# Patient Record
Sex: MALE | Race: WHITE | Employment: FULL TIME | URBAN - METROPOLITAN AREA
[De-identification: names, ages, dates, MRNs, and addresses within clinical notes are randomized per-mention and may not be internally consistent; named-entity substitution may affect disease eponyms.]

---

## 2018-03-21 ENCOUNTER — HOSPITAL ENCOUNTER (OUTPATIENT)
Dept: CV DIAGNOSTICS | Facility: HOSPITAL | Age: 73
Discharge: HOME | End: 2018-03-21
Attending: FAMILY MEDICINE
Payer: COMMERCIAL

## 2018-03-21 DIAGNOSIS — R07.89 CHEST PRESSURE: ICD-10-CM

## 2018-03-21 DIAGNOSIS — I35.1 NONRHEUMATIC AORTIC VALVE INSUFFICIENCY: ICD-10-CM

## 2018-03-21 DIAGNOSIS — R00.0 TACHYCARDIA: ICD-10-CM

## 2018-03-21 DIAGNOSIS — I35.1 NONRHEUMATIC AORTIC VALVE INSUFFICIENCY: Primary | ICD-10-CM

## 2018-03-21 DIAGNOSIS — R94.31 ABNORMAL EKG: ICD-10-CM

## 2018-03-21 PROCEDURE — 93350 STRESS TTE ONLY: CPT | Performed by: FAMILY MEDICINE

## 2018-03-21 PROCEDURE — 93017 CV STRESS TEST TRACING ONLY: CPT | Performed by: FAMILY MEDICINE

## 2018-03-21 PROCEDURE — 93018 CV STRESS TEST I&R ONLY: CPT | Performed by: FAMILY MEDICINE

## 2018-03-21 NOTE — PROGRESS NOTES
Pt here for stress echo. Preliminary echo images show mild to mod aortic insufficiency and aortic dilation (root approx. 4.6cm). Message left with Delorise Sow at Dr. Patricia Pereira office. Message left on Dr. Patricia Pereira cell phone voicemail, no return call received.

## 2021-03-15 DIAGNOSIS — Z23 NEED FOR VACCINATION: ICD-10-CM

## 2023-06-15 ENCOUNTER — HOSPITAL ENCOUNTER (OUTPATIENT)
Dept: CV DIAGNOSTICS | Facility: HOSPITAL | Age: 78
Discharge: HOME | End: 2023-06-15
Attending: FAMILY MEDICINE
Payer: COMMERCIAL

## 2023-06-15 DIAGNOSIS — I25.10 CORONARY ARTERY CALCIFICATION SEEN ON CAT SCAN: ICD-10-CM

## 2023-06-15 PROCEDURE — 93017 CV STRESS TEST TRACING ONLY: CPT | Performed by: FAMILY MEDICINE

## 2023-06-15 PROCEDURE — 93018 CV STRESS TEST I&R ONLY: CPT | Performed by: FAMILY MEDICINE

## 2023-06-15 PROCEDURE — 93350 STRESS TTE ONLY: CPT | Performed by: FAMILY MEDICINE
